# Patient Record
Sex: FEMALE | URBAN - METROPOLITAN AREA
[De-identification: names, ages, dates, MRNs, and addresses within clinical notes are randomized per-mention and may not be internally consistent; named-entity substitution may affect disease eponyms.]

---

## 2019-07-22 ENCOUNTER — TELEPHONE (OUTPATIENT)
Dept: OBGYN CLINIC | Facility: CLINIC | Age: 44
End: 2019-07-22

## 2019-07-22 NOTE — TELEPHONE ENCOUNTER
[facility], [facility] sent form over to get records. Due to not seeing any records in the system, this was given to Keyshawn SALCEDO to sign off on.

## 2019-07-26 NOTE — TELEPHONE ENCOUNTER
No records in the system, faxed back asking for them to provide us with additional information ( of patient) to see if we are able to pull up any records that way.